# Patient Record
Sex: FEMALE | Race: OTHER | NOT HISPANIC OR LATINO
[De-identification: names, ages, dates, MRNs, and addresses within clinical notes are randomized per-mention and may not be internally consistent; named-entity substitution may affect disease eponyms.]

---

## 2019-09-24 ENCOUNTER — RESULT REVIEW (OUTPATIENT)
Age: 59
End: 2019-09-24

## 2021-10-26 ENCOUNTER — EMERGENCY (EMERGENCY)
Facility: HOSPITAL | Age: 61
LOS: 1 days | Discharge: HOME | End: 2021-10-26
Attending: EMERGENCY MEDICINE | Admitting: EMERGENCY MEDICINE
Payer: COMMERCIAL

## 2021-10-26 VITALS
RESPIRATION RATE: 18 BRPM | SYSTOLIC BLOOD PRESSURE: 153 MMHG | DIASTOLIC BLOOD PRESSURE: 85 MMHG | TEMPERATURE: 98 F | OXYGEN SATURATION: 99 % | HEART RATE: 78 BPM

## 2021-10-26 VITALS
RESPIRATION RATE: 18 BRPM | OXYGEN SATURATION: 100 % | HEART RATE: 74 BPM | DIASTOLIC BLOOD PRESSURE: 95 MMHG | SYSTOLIC BLOOD PRESSURE: 159 MMHG

## 2021-10-26 LAB
ALBUMIN SERPL ELPH-MCNC: 4.6 G/DL — SIGNIFICANT CHANGE UP (ref 3.3–5)
ALP SERPL-CCNC: 56 U/L — SIGNIFICANT CHANGE UP (ref 40–120)
ALT FLD-CCNC: 35 U/L — HIGH (ref 4–33)
ANION GAP SERPL CALC-SCNC: 12 MMOL/L — SIGNIFICANT CHANGE UP (ref 7–14)
AST SERPL-CCNC: 31 U/L — SIGNIFICANT CHANGE UP (ref 4–32)
BASOPHILS # BLD AUTO: 0.05 K/UL — SIGNIFICANT CHANGE UP (ref 0–0.2)
BASOPHILS NFR BLD AUTO: 0.9 % — SIGNIFICANT CHANGE UP (ref 0–2)
BILIRUB SERPL-MCNC: 0.4 MG/DL — SIGNIFICANT CHANGE UP (ref 0.2–1.2)
BUN SERPL-MCNC: 15 MG/DL — SIGNIFICANT CHANGE UP (ref 7–23)
CALCIUM SERPL-MCNC: 9.4 MG/DL — SIGNIFICANT CHANGE UP (ref 8.4–10.5)
CHLORIDE SERPL-SCNC: 106 MMOL/L — SIGNIFICANT CHANGE UP (ref 98–107)
CO2 SERPL-SCNC: 22 MMOL/L — SIGNIFICANT CHANGE UP (ref 22–31)
CREAT SERPL-MCNC: 0.86 MG/DL — SIGNIFICANT CHANGE UP (ref 0.5–1.3)
EOSINOPHIL # BLD AUTO: 0.1 K/UL — SIGNIFICANT CHANGE UP (ref 0–0.5)
EOSINOPHIL NFR BLD AUTO: 1.7 % — SIGNIFICANT CHANGE UP (ref 0–6)
GLUCOSE SERPL-MCNC: 121 MG/DL — HIGH (ref 70–99)
HCT VFR BLD CALC: 40.6 % — SIGNIFICANT CHANGE UP (ref 34.5–45)
HGB BLD-MCNC: 12.9 G/DL — SIGNIFICANT CHANGE UP (ref 11.5–15.5)
IANC: 3.43 K/UL — SIGNIFICANT CHANGE UP (ref 1.5–8.5)
LYMPHOCYTES # BLD AUTO: 0.37 K/UL — LOW (ref 1–3.3)
LYMPHOCYTES # BLD AUTO: 6.2 % — LOW (ref 13–44)
MCHC RBC-ENTMCNC: 21.9 PG — LOW (ref 27–34)
MCHC RBC-ENTMCNC: 31.8 GM/DL — LOW (ref 32–36)
MCV RBC AUTO: 68.8 FL — LOW (ref 80–100)
MONOCYTES # BLD AUTO: 0.42 K/UL — SIGNIFICANT CHANGE UP (ref 0–0.9)
MONOCYTES NFR BLD AUTO: 7.1 % — SIGNIFICANT CHANGE UP (ref 2–14)
NEUTROPHILS # BLD AUTO: 3.86 K/UL — SIGNIFICANT CHANGE UP (ref 1.8–7.4)
NEUTROPHILS NFR BLD AUTO: 65.5 % — SIGNIFICANT CHANGE UP (ref 43–77)
PLATELET # BLD AUTO: 314 K/UL — SIGNIFICANT CHANGE UP (ref 150–400)
POTASSIUM SERPL-MCNC: 3.8 MMOL/L — SIGNIFICANT CHANGE UP (ref 3.5–5.3)
POTASSIUM SERPL-SCNC: 3.8 MMOL/L — SIGNIFICANT CHANGE UP (ref 3.5–5.3)
PROT SERPL-MCNC: 8.2 G/DL — SIGNIFICANT CHANGE UP (ref 6–8.3)
RBC # BLD: 5.9 M/UL — HIGH (ref 3.8–5.2)
RBC # FLD: 15 % — HIGH (ref 10.3–14.5)
SODIUM SERPL-SCNC: 140 MMOL/L — SIGNIFICANT CHANGE UP (ref 135–145)
TROPONIN T, HIGH SENSITIVITY RESULT: <6 NG/L — SIGNIFICANT CHANGE UP
TROPONIN T, HIGH SENSITIVITY RESULT: <6 NG/L — SIGNIFICANT CHANGE UP
WBC # BLD: 5.89 K/UL — SIGNIFICANT CHANGE UP (ref 3.8–10.5)
WBC # FLD AUTO: 5.89 K/UL — SIGNIFICANT CHANGE UP (ref 3.8–10.5)

## 2021-10-26 PROCEDURE — 73090 X-RAY EXAM OF FOREARM: CPT | Mod: 26,RT,77

## 2021-10-26 PROCEDURE — 73090 X-RAY EXAM OF FOREARM: CPT | Mod: 26,RT

## 2021-10-26 PROCEDURE — 99285 EMERGENCY DEPT VISIT HI MDM: CPT

## 2021-10-26 PROCEDURE — 73080 X-RAY EXAM OF ELBOW: CPT | Mod: 26,RT

## 2021-10-26 PROCEDURE — 73110 X-RAY EXAM OF WRIST: CPT | Mod: 26,RT
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
## 2021-10-26 NOTE — ED PROVIDER NOTE - PRINCIPAL DIAGNOSIS
Radial fracture Closed traumatic nondisplaced fracture of distal end of right radius, initial encounter
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
## 2021-10-26 NOTE — ED ADULT TRIAGE NOTE - BP NONINVASIVE SYSTOLIC (MM HG)
No new SRF/IRF.  Vision stable. Recommend treatment today as previously discussed.  Goal is to maintain current level of vision.  Patient states understanding. 153
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
## 2021-10-26 NOTE — ED PROVIDER NOTE - CARE PROVIDER_API CALL
Sara Walker (MD; MPH)  Orthopaedic Surgery  [address], [address]  [place] [address]  Phone: [phone]  Fax: [phone]  Follow Up Time:
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
## 2021-10-26 NOTE — ED PROVIDER NOTE - PROGRESS NOTE DETAILS
Fx reduced by Ortho, and casted, at bedside. 2nd trop result pending (pt c mild residual CP). ECG unchanged. Fx reduced by Ortho, and casted, at bedside.  Ortho also sutured hand laceration now under cast (I was not able to visualize repair prior to casting) and will f/u c wound care/suture removal via Dr. Walker.

## 2021-10-26 NOTE — ED PROVIDER NOTE - NSFOLLOWUPINSTRUCTIONS_ED_ALL_ED_FT
You have broken your Radius bone close to the wrist.  keep cast completely dry, be sure to cover it was a plastic bag or cast cover when bathing.  Keep arm in sling for comfort.      Make an appointment with Dr. Olmstead (Orthopedics) to be seen Thursday (call today) - [phone]    You may take Ibuprofen 600 mg every 6 hours if needed for pain, and/or Tylenol 650 mg every 6 hours if needed. keep arm elevated when resting.     Return to the ER for worsening pain, hand swelling or skin changes, numbness, fevers, or other concerning signs. You have broken your Radius bone close to the wrist.  keep cast completely dry, be sure to cover it was a plastic bag or cast cover when bathing.  Keep arm in sling for comfort.      Make an appointment with Dr. Walker (Orthopedics) to be seen Thursday (call today) - [phone]    You may take Ibuprofen 600 mg every 6 hours if needed for pain, and/or Tylenol 650 mg every 6 hours if needed. keep arm elevated when resting.     Return to the ER for worsening pain, hand swelling or skin changes, numbness, fevers, or other concerning signs.

## 2021-10-26 NOTE — ED ADULT NURSE NOTE - SUICIDE SCREENING QUESTION 3
1) Pt calls, states she has upcoming lab appt for labs t/ cardiologist on Friday. Asking PCP to place any lab orders he wants for them to be drawn then as well. She will be fasting.     Cardiologist already has lipid profile and BMP as future orders. Pended A1C.    2) Pt requesting refill of Clorazepate. Aware that clinic is requesting an appt as it's been >1 year. Pt requests orders for labs and refill. Will call back once blood drawn to schedule appt. Discuss scheduling appt as soon as possible as MD is scheduling into early Sept at this time.    Clorazepate      Last Written Prescription Date:  05/19/17  Last Fill Quantity: 60,   # refills: 0  [place] [facility] or [facility] prescribing provider: 04/05/16  Future Office visit:    Next 5 appointments (look out 90 days)     Jul 18, 2017 10:45 AM CDT   Return Visit with Hiren Collins MD   [facility] [place] ([facility])    [address] [address] [address] [phone]                   Routing refill request to provider for review/approval because:  Last rx asked pt come in for appt    Please advise, thanks.    Pt requesting f/u phone call.   No

## 2021-10-26 NOTE — ED PROVIDER NOTE - PATIENT PORTAL LINK FT
You can access the FollowMyHealth Patient Portal offered by [facility] by registering at the following website: [email or web address]. By joining [facility]’s FollowMyHealth portal, you will also be able to view your health information using other applications (apps) compatible with our system.
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
## 2021-10-26 NOTE — ED PROVIDER NOTE - PHYSICAL EXAMINATION
Wrist appears dislocated vs fx with dorsal displacement of distal portion.   Able to range fingers, sensation intact.  Cap refill in fingers < 3 sec, hand appears warm and well perfused.   No tenderness at elbow or other joints/bones. FROM otherwise.   Has blood on hand, may have a palpmar injury which was bandaged by EMS but eval ilmited d/t wrist. Wrist appears dislocated vs fx with dorsal displacement of distal portion.   Able to range fingers, sensation intact.  Cap refill in fingers < 3 sec, hand appears warm and well perfused.   No tenderness at elbow or other joints/bones. FROM otherwise.   Has blood on hand, may have a palmar injury which was bandaged by EMS but eval ilmited d/t wrist.  >.addendum: 4 cm linear laceration to palmar aspect between digits 1 and 2 (repaired by ortho while casting).

## 2021-10-26 NOTE — ED PROVIDER NOTE - CARE PLAN
Principal Discharge DX:	Radial fracture   1 Principal Discharge DX:	Closed traumatic nondisplaced fracture of distal end of right radius, initial encounter

## 2021-10-27 ENCOUNTER — NON-APPOINTMENT (OUTPATIENT)
Age: 61
End: 2021-10-27

## 2021-10-28 ENCOUNTER — APPOINTMENT (OUTPATIENT)
Dept: ORTHOPEDIC SURGERY | Facility: CLINIC | Age: 61
End: 2021-10-28
Payer: COMMERCIAL

## 2021-10-28 VITALS
WEIGHT: 144 LBS | HEART RATE: 82 BPM | BODY MASS INDEX: 24.59 KG/M2 | HEIGHT: 64 IN | OXYGEN SATURATION: 96 % | SYSTOLIC BLOOD PRESSURE: 170 MMHG | DIASTOLIC BLOOD PRESSURE: 95 MMHG

## 2021-10-28 PROCEDURE — 99204 OFFICE O/P NEW MOD 45 MIN: CPT

## 2021-10-28 PROCEDURE — 99072 ADDL SUPL MATRL&STAF TM PHE: CPT

## 2021-11-01 ENCOUNTER — APPOINTMENT (OUTPATIENT)
Dept: ORTHOPEDIC SURGERY | Facility: CLINIC | Age: 61
End: 2021-11-01

## 2021-11-03 ENCOUNTER — OUTPATIENT (OUTPATIENT)
Dept: OUTPATIENT SERVICES | Facility: HOSPITAL | Age: 61
LOS: 1 days | End: 2021-11-03
Payer: COMMERCIAL

## 2021-11-03 VITALS
HEART RATE: 77 BPM | HEIGHT: 64 IN | SYSTOLIC BLOOD PRESSURE: 144 MMHG | TEMPERATURE: 98 F | DIASTOLIC BLOOD PRESSURE: 85 MMHG | OXYGEN SATURATION: 98 % | WEIGHT: 141.76 LBS | RESPIRATION RATE: 18 BRPM

## 2021-11-03 DIAGNOSIS — Z01.818 ENCOUNTER FOR OTHER PREPROCEDURAL EXAMINATION: ICD-10-CM

## 2021-11-03 DIAGNOSIS — Z11.52 ENCOUNTER FOR SCREENING FOR COVID-19: ICD-10-CM

## 2021-11-03 DIAGNOSIS — S52.571D OTHER INTRAARTICULAR FRACTURE OF LOWER END OF RIGHT RADIUS, SUBSEQUENT ENCOUNTER FOR CLOSED FRACTURE WITH ROUTINE HEALING: ICD-10-CM

## 2021-11-03 DIAGNOSIS — S52.571A OTHER INTRAARTICULAR FRACTURE OF LOWER END OF RIGHT RADIUS, INITIAL ENCOUNTER FOR CLOSED FRACTURE: ICD-10-CM

## 2021-11-03 LAB
ANION GAP SERPL CALC-SCNC: 12 MMOL/L — SIGNIFICANT CHANGE UP (ref 5–17)
BUN SERPL-MCNC: 12 MG/DL — SIGNIFICANT CHANGE UP (ref 7–23)
CALCIUM SERPL-MCNC: 9.5 MG/DL — SIGNIFICANT CHANGE UP (ref 8.4–10.5)
CHLORIDE SERPL-SCNC: 104 MMOL/L — SIGNIFICANT CHANGE UP (ref 96–108)
CO2 SERPL-SCNC: 23 MMOL/L — SIGNIFICANT CHANGE UP (ref 22–31)
CREAT SERPL-MCNC: 0.89 MG/DL — SIGNIFICANT CHANGE UP (ref 0.5–1.3)
GLUCOSE SERPL-MCNC: 112 MG/DL — HIGH (ref 70–99)
HCT VFR BLD CALC: 37.6 % — SIGNIFICANT CHANGE UP (ref 34.5–45)
HGB BLD-MCNC: 11.7 G/DL — SIGNIFICANT CHANGE UP (ref 11.5–15.5)
MCHC RBC-ENTMCNC: 21.6 PG — LOW (ref 27–34)
MCHC RBC-ENTMCNC: 31.1 GM/DL — LOW (ref 32–36)
MCV RBC AUTO: 69.4 FL — LOW (ref 80–100)
NRBC # BLD: 0 /100 WBCS — SIGNIFICANT CHANGE UP (ref 0–0)
PLATELET # BLD AUTO: 363 K/UL — SIGNIFICANT CHANGE UP (ref 150–400)
POTASSIUM SERPL-MCNC: 3.9 MMOL/L — SIGNIFICANT CHANGE UP (ref 3.5–5.3)
POTASSIUM SERPL-SCNC: 3.9 MMOL/L — SIGNIFICANT CHANGE UP (ref 3.5–5.3)
RBC # BLD: 5.42 M/UL — HIGH (ref 3.8–5.2)
RBC # FLD: 14.6 % — HIGH (ref 10.3–14.5)
SARS-COV-2 RNA SPEC QL NAA+PROBE: SIGNIFICANT CHANGE UP
SODIUM SERPL-SCNC: 139 MMOL/L — SIGNIFICANT CHANGE UP (ref 135–145)
WBC # BLD: 6.4 K/UL — SIGNIFICANT CHANGE UP (ref 3.8–10.5)
WBC # FLD AUTO: 6.4 K/UL — SIGNIFICANT CHANGE UP (ref 3.8–10.5)

## 2021-11-03 PROCEDURE — 80048 BASIC METABOLIC PNL TOTAL CA: CPT

## 2021-11-03 PROCEDURE — 85027 COMPLETE CBC AUTOMATED: CPT

## 2021-11-03 PROCEDURE — G0463: CPT

## 2021-11-03 PROCEDURE — U0003: CPT

## 2021-11-03 PROCEDURE — C9803: CPT

## 2021-11-03 PROCEDURE — U0005: CPT

## 2021-11-03 RX ORDER — LIDOCAINE HCL 20 MG/ML
0.2 VIAL (ML) INJECTION ONCE
Refills: 0 | Status: DISCONTINUED | OUTPATIENT
Start: 2021-11-05 | End: 2021-11-19

## 2021-11-03 RX ORDER — CEFAZOLIN SODIUM 1 G
2000 VIAL (EA) INJECTION ONCE
Refills: 0 | Status: DISCONTINUED | OUTPATIENT
Start: 2021-11-05 | End: 2021-11-19

## 2021-11-03 RX ORDER — SODIUM CHLORIDE 9 MG/ML
3 INJECTION INTRAMUSCULAR; INTRAVENOUS; SUBCUTANEOUS EVERY 8 HOURS
Refills: 0 | Status: DISCONTINUED | OUTPATIENT
Start: 2021-11-05 | End: 2021-11-19

## 2021-11-03 NOTE — H&P PST ADULT - MUSCULOSKELETAL COMMENTS
per HPI right forearm with splint/ace/dressing intact , fingers warm and mobile with good capillary refill
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
## 2021-11-04 ENCOUNTER — TRANSCRIPTION ENCOUNTER (OUTPATIENT)
Age: 61
End: 2021-11-04

## 2021-11-04 RX ORDER — OXYCODONE 5 MG/1
5 TABLET ORAL
Qty: 20 | Refills: 0 | Status: ACTIVE | COMMUNITY
Start: 2021-11-04 | End: 1900-01-01

## 2021-11-05 ENCOUNTER — OUTPATIENT (OUTPATIENT)
Dept: OUTPATIENT SERVICES | Facility: HOSPITAL | Age: 61
LOS: 1 days | End: 2021-11-05
Payer: COMMERCIAL

## 2021-11-05 ENCOUNTER — APPOINTMENT (OUTPATIENT)
Dept: ORTHOPEDIC SURGERY | Facility: HOSPITAL | Age: 61
End: 2021-11-05

## 2021-11-05 VITALS
SYSTOLIC BLOOD PRESSURE: 135 MMHG | OXYGEN SATURATION: 98 % | TEMPERATURE: 98 F | RESPIRATION RATE: 14 BRPM | DIASTOLIC BLOOD PRESSURE: 76 MMHG | HEART RATE: 84 BPM

## 2021-11-05 VITALS
WEIGHT: 141.76 LBS | RESPIRATION RATE: 20 BRPM | HEART RATE: 85 BPM | SYSTOLIC BLOOD PRESSURE: 135 MMHG | TEMPERATURE: 98 F | HEIGHT: 64 IN | OXYGEN SATURATION: 100 % | DIASTOLIC BLOOD PRESSURE: 86 MMHG

## 2021-11-05 DIAGNOSIS — S52.571D OTHER INTRAARTICULAR FRACTURE OF LOWER END OF RIGHT RADIUS, SUBSEQUENT ENCOUNTER FOR CLOSED FRACTURE WITH ROUTINE HEALING: ICD-10-CM

## 2021-11-05 PROCEDURE — C1713: CPT

## 2021-11-05 PROCEDURE — 76000 FLUOROSCOPY <1 HR PHYS/QHP: CPT

## 2021-11-05 PROCEDURE — 25608 OPTX DST RD XART FX/EPI SEP2: CPT | Mod: RT

## 2021-11-05 PROCEDURE — 25609 OPTX DST RD XART FX/EP SEP3+: CPT | Mod: RT

## 2021-11-05 RX ORDER — CHLORHEXIDINE GLUCONATE 213 G/1000ML
1 SOLUTION TOPICAL ONCE
Refills: 0 | Status: COMPLETED | OUTPATIENT
Start: 2021-11-05 | End: 2021-11-05

## 2021-11-05 RX ADMIN — CHLORHEXIDINE GLUCONATE 1 APPLICATION(S): 213 SOLUTION TOPICAL at 06:00

## 2021-11-05 NOTE — ASU PREOP CHECKLIST - NS PREOP CHK CHLOROHEX WASH
1. We will call you with the results of your strep test later today.    2. Drink plenty of fluids over the next week. You can take ibuprofen or tylenol every 8 hours as needed for pain or fevers.    3. Return to the Urgent Care if you develop any new or worsening symptoms. Otherwise follow up with your primary care provider as needed    The CDC recommends public isolation to be discontinued when all of the following have occurred:    1. 10 days has elapsed from the day the patient first experienced symptoms.  AND  2. The patient is free of fever for 24 hours  AND  3. Cough, shortness of breath, and other symptoms are improving     What to do if you are sick tip sheet:  https://www.cdc.gov/coronavirus/2019-ncov/downloads/sick-with-2019-nCoV-fact-sheet.pdf     If you have any further questions or concerns regarding the Coronavirus, please call our Hotline number at [phone].     in ASU:

## 2021-11-05 NOTE — PRE-ANESTHESIA EVALUATION ADULT - NSANTHOSAYNRD_GEN_A_CORE
No. SYDNI screening performed.  STOP BANG Legend: 0-2 = LOW Risk; 3-4 = INTERMEDIATE Risk; 5-8 = HIGH Risk

## 2021-11-05 NOTE — ASU DISCHARGE PLAN (ADULT/PEDIATRIC) - CARE PROVIDER_API CALL
Sara Walker (MD; MPH)  Orthopaedic Surgery  [address], [address]  [place] [address]  Phone: [phone]  Fax: [phone]  Follow Up Time:

## 2021-11-18 ENCOUNTER — APPOINTMENT (OUTPATIENT)
Dept: ORTHOPEDIC SURGERY | Facility: CLINIC | Age: 61
End: 2021-11-18
Payer: COMMERCIAL

## 2021-11-18 DIAGNOSIS — Z00.00 ENCOUNTER FOR GENERAL ADULT MEDICAL EXAMINATION W/OUT ABNORMAL FINDINGS: ICD-10-CM

## 2021-11-18 PROBLEM — I10 ESSENTIAL (PRIMARY) HYPERTENSION: Chronic | Status: ACTIVE | Noted: 2021-10-26

## 2021-11-18 PROCEDURE — 99024 POSTOP FOLLOW-UP VISIT: CPT

## 2021-11-18 PROCEDURE — 73110 X-RAY EXAM OF WRIST: CPT | Mod: RT

## 2021-12-23 ENCOUNTER — APPOINTMENT (OUTPATIENT)
Dept: ORTHOPEDIC SURGERY | Facility: CLINIC | Age: 61
End: 2021-12-23
Payer: COMMERCIAL

## 2021-12-23 PROCEDURE — 73110 X-RAY EXAM OF WRIST: CPT | Mod: RT

## 2021-12-23 PROCEDURE — 99024 POSTOP FOLLOW-UP VISIT: CPT

## 2022-01-27 ENCOUNTER — APPOINTMENT (OUTPATIENT)
Dept: ORTHOPEDIC SURGERY | Facility: CLINIC | Age: 62
End: 2022-01-27
Payer: COMMERCIAL

## 2022-01-27 DIAGNOSIS — S52.571D OTHER INTRAARTICULAR FRACTURE OF LOWER END OF RIGHT RADIUS, SUBSEQUENT ENCOUNTER FOR CLOSED FRACTURE WITH ROUTINE HEALING: ICD-10-CM

## 2022-01-27 PROCEDURE — 99024 POSTOP FOLLOW-UP VISIT: CPT

## 2023-04-26 NOTE — H&P PST ADULT - TERM DELIVERIES, OB PROFILE
-- DO NOT REPLY / DO NOT REPLY ALL --  -- Message is from Engagement Center Operations (ECO) --    General Patient Message:     Patient is requesting a call back from 's nurse. She just left the office and forgot to ask for a doctor's note.      Caller Information       Type Contact Phone/Fax    04/26/2023 03:59 PM CDT Phone (Incoming) Sobia Mancini (Self) [phone] (M)        Alternative phone number: N/A     Can a detailed message be left? Yes    Message Turnaround:     Is it Working Hours? Yes - Working Hours     [place]:    Please give this turnaround time to the caller:   \"This message will be sent to [state Provider's name]. The clinical team will fulfill your request as soon as they review your message.\"                 0

## 2024-10-22 ENCOUNTER — APPOINTMENT (OUTPATIENT)
Dept: OBGYN | Facility: CLINIC | Age: 64
End: 2024-10-22

## 2025-03-13 NOTE — H&P PST ADULT - FALLEN IN THE PAST
Patient: Lupe Serrato  : 1942    Encounter Date: 2025    Subjective  Patient ID: Lupe Serrato is a 82 y.o. female who is long term resident being seen and evaluated for multiple medical problems.    HPI   This 82-year-old female patient is resting comfortably in her room in no distress.  She continues to go out to smoke regularly.  Her oral intake is failing per nursing.  The patient has no complaints of pain or shortness of breath and again reiterates her wish not to be intervened upon not to be tested not to be prodded and poked.  This is consistent with her long-term wishes here in the extended-care facility.  He    Current high risk medication:  Seroquel  Ingrezza    Laboratory examinations are refused.    Note is made of a weight of 93.7 pounds, weight in 2024 was 109 pounds, weight in 2023 was 135 pounds    Review of Systems   Constitutional:  Positive for fatigue and unexpected weight change. Negative for chills and fever.   Respiratory:  Negative for cough and shortness of breath.    Cardiovascular:  Negative for chest pain and palpitations.   Gastrointestinal:  Negative for abdominal pain, constipation, diarrhea, nausea and vomiting.   Genitourinary:  Negative for difficulty urinating.       Objective  /65   Pulse 68   Resp 16   Wt (!) 42.6 kg (94 lb)   SpO2 96%   BMI 10.86 kg/m²     Physical Exam  Constitutional:       General: She is not in acute distress.     Comments: Cachectic   HENT:      Head: Normocephalic and atraumatic.   Eyes:      Conjunctiva/sclera: Conjunctivae normal.   Cardiovascular:      Rate and Rhythm: Normal rate and regular rhythm.   Pulmonary:      Effort: Pulmonary effort is normal. No respiratory distress.      Breath sounds: Normal breath sounds.      Comments: Diminished t/o  Abdominal:      General: Bowel sounds are normal. There is no distension.      Palpations: Abdomen is soft.      Tenderness: There is no abdominal tenderness.    Musculoskeletal:         General: Normal range of motion.      Right lower leg: No edema.      Left lower leg: No edema.   Skin:     General: Skin is warm and dry.   Neurological:      General: No focal deficit present.      Mental Status: She is alert and oriented to person, place, and time.   Psychiatric:         Mood and Affect: Mood normal.         Behavior: Behavior normal.         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  We will continue with restorative and supportive care as the patient tolerates    B.  I have asked nursing to contact the patient's guardian and to offer palliative care and/or hospice services for this long-term patient to has consistently asked not to be tested or to receive any medical care whatsoever.  She has lost in excess of 35 pounds in the last 2 years and her prognosis at this stage and with this level of protein calorie malnutrition is poor.    C.  The patient's prognosis is [id number].        Electronically Signed By: Devonte Mcclure MD   3/19/25  5:18 PM   no
